# Patient Record
Sex: FEMALE | Race: WHITE | NOT HISPANIC OR LATINO | Employment: UNEMPLOYED | URBAN - METROPOLITAN AREA
[De-identification: names, ages, dates, MRNs, and addresses within clinical notes are randomized per-mention and may not be internally consistent; named-entity substitution may affect disease eponyms.]

---

## 2018-01-31 ENCOUNTER — HOSPITAL ENCOUNTER (EMERGENCY)
Facility: HOSPITAL | Age: 48
Discharge: HOME | End: 2018-01-31
Payer: COMMERCIAL

## 2018-01-31 VITALS
BODY MASS INDEX: 33.12 KG/M2 | OXYGEN SATURATION: 99 % | RESPIRATION RATE: 18 BRPM | WEIGHT: 194 LBS | DIASTOLIC BLOOD PRESSURE: 74 MMHG | HEART RATE: 103 BPM | TEMPERATURE: 97.8 F | HEIGHT: 64 IN | SYSTOLIC BLOOD PRESSURE: 125 MMHG
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
## 2018-01-31 PROCEDURE — 99283 EMERGENCY DEPT VISIT LOW MDM: CPT

## 2018-01-31 RX ORDER — LIDOCAINE HYDROCHLORIDE 10 MG/ML
10 INJECTION, SOLUTION EPIDURAL; INFILTRATION; INTRACAUDAL; PERINEURAL ONCE
Status: COMPLETED | OUTPATIENT
Start: 2018-01-31 | End: 2018-01-31

## 2018-01-31 RX ORDER — HYDROCODONE BITARTRATE AND ACETAMINOPHEN 5; 325 MG/1; MG/1
1 TABLET ORAL ONCE
Status: COMPLETED | OUTPATIENT
Start: 2018-01-31 | End: 2018-01-31

## 2018-01-31 RX ORDER — AMOXICILLIN AND CLAVULANATE POTASSIUM 875; 125 MG/1; MG/1
1 TABLET, FILM COATED ORAL ONCE
Status: COMPLETED | OUTPATIENT
Start: 2018-01-31 | End: 2018-01-31

## 2018-01-31 RX ORDER — AMOXICILLIN AND CLAVULANATE POTASSIUM 875; 125 MG/1; MG/1
1 TABLET, FILM COATED ORAL EVERY 12 HOURS SCHEDULED
Qty: 20 TABLET | Refills: 0 | Status: SHIPPED | OUTPATIENT
Start: 2018-01-31 | End: 2018-02-10

## 2018-01-31 RX ORDER — GINSENG 100 MG
1 CAPSULE ORAL ONCE
Status: COMPLETED | OUTPATIENT
Start: 2018-01-31 | End: 2018-01-31

## 2018-01-31 RX ORDER — FLUCONAZOLE 200 MG/1
200 TABLET ORAL DAILY
Qty: 1 TABLET | Refills: 0 | Status: SHIPPED | OUTPATIENT
Start: 2018-01-31 | End: 2018-02-01

## 2018-01-31 RX ADMIN — AMOXICILLIN AND CLAVULANATE POTASSIUM 1 TABLET: 875; 125 TABLET, FILM COATED ORAL at 18:44

## 2018-01-31 RX ADMIN — LIDOCAINE HYDROCHLORIDE 10 ML: 10 INJECTION, SOLUTION EPIDURAL; INFILTRATION; INTRACAUDAL; PERINEURAL at 18:56

## 2018-01-31 RX ADMIN — LIDOCAINE HYDROCHLORIDE 10 ML: 10 INJECTION, SOLUTION EPIDURAL; INFILTRATION; INTRACAUDAL; PERINEURAL at 18:43

## 2018-01-31 RX ADMIN — HYDROCODONE BITARTRATE AND ACETAMINOPHEN 1 TABLET: 5; 325 TABLET ORAL at 19:35

## 2018-01-31 RX ADMIN — BACITRACIN ZINC 1 SMALL APPLICATION: 500 OINTMENT TOPICAL at 18:44

## 2018-01-31 NOTE — ED PROVIDER NOTES
History  Chief Complaint   Patient presents with    Dog Bite     Has laceration on left forearm     60-year-old female presenting today with a dog bite to the left forearm that occurred about an hour ago  States that she was breaking up a dog fight when a Mark Kedar/morfin retriever bit her left arm  She owns these dogs and are up to date on vaccinations  Currently 6/10 pain nonradiating  Last tetanus vaccination was a year ago  States that the wound is gaping  None       Past Medical History:   Diagnosis Date    Disease of thyroid gland     Iron deficiency anemia        Past Surgical History:   Procedure Laterality Date    BACK SURGERY         History reviewed  No pertinent family history  I have reviewed and agree with the history as documented  Social History   Substance Use Topics    Smoking status: Never Smoker    Smokeless tobacco: Never Used    Alcohol use No        Review of Systems   Constitutional: Negative  Negative for activity change and appetite change  HENT: Negative  Eyes: Negative  Respiratory: Negative  Cardiovascular: Negative  Gastrointestinal: Negative  Genitourinary: Negative  Musculoskeletal: Negative  Skin: Positive for wound  Negative for color change, pallor and rash  Neurological: Negative  All other systems reviewed and are negative  Physical Exam  ED Triage Vitals [01/31/18 1824]   Temperature Pulse Respirations Blood Pressure SpO2   97 8 °F (36 6 °C) 103 18 125/74 99 %      Temp Source Heart Rate Source Patient Position - Orthostatic VS BP Location FiO2 (%)   Tympanic Monitor Sitting Right arm --      Pain Score       6           Orthostatic Vital Signs  Vitals:    01/31/18 1824   BP: 125/74   Pulse: 103   Patient Position - Orthostatic VS: Sitting       Physical Exam   Constitutional: She is oriented to person, place, and time  She appears well-developed and well-nourished     Cardiovascular: Normal rate, regular rhythm, normal heart sounds and intact distal pulses  Pulmonary/Chest: Effort normal and breath sounds normal    S PO2 is 99% indicating adequate oxygenation  Musculoskeletal: Normal range of motion  Arms:  Neurological: She is alert and oriented to person, place, and time  Skin: Skin is warm and dry  Capillary refill takes less than 2 seconds  Nursing note and vitals reviewed  ED Medications  Medications   HYDROcodone-acetaminophen (NORCO) 5-325 mg per tablet 1 tablet (not administered)   amoxicillin-clavulanate (AUGMENTIN) 875-125 mg per tablet 1 tablet (1 tablet Oral Given 1/31/18 1844)   lidocaine (PF) (XYLOCAINE-MPF) 1 % injection 10 mL (10 mL Infiltration Given 1/31/18 1843)   bacitracin topical ointment 1 small application (1 small application Topical Given 1/31/18 1844)   lidocaine (PF) (XYLOCAINE-MPF) 1 % injection 10 mL (10 mL Infiltration Given 1/31/18 1856)       Diagnostic Studies  Results Reviewed     None                 No orders to display              Procedures  Lac Repair  Date/Time: 1/31/2018 7:26 PM  Performed by: Shira Sen by: Salbador Raza   Consent: Verbal consent obtained    Risks and benefits: risks, benefits and alternatives were discussed  Consent given by: patient  Patient understanding: patient states understanding of the procedure being performed  Patient consent: the patient's understanding of the procedure matches consent given  Procedure consent: procedure consent matches procedure scheduled  Relevant documents: relevant documents present and verified  Test results: test results available and properly labeled  Site marked: the operative site was marked  Imaging studies: imaging studies available  Required items: required blood products, implants, devices, and special equipment available  Patient identity confirmed: verbally with patient  Time out: Immediately prior to procedure a "time out" was called to verify the correct patient, procedure, equipment, support staff and site/side marked as required  Body area: upper extremity  Location details: left lower arm  Laceration length: 9 cm  Contamination: The wound is contaminated  Tendon involvement: none  Nerve involvement: none  Anesthesia: local infiltration    Anesthesia:  Local Anesthetic: lidocaine 1% without epinephrine  Anesthetic total: 20 mL    Sedation:  Patient sedated: no    Wound Dehiscence:  Superficial Wound Dehiscence: simple closure      Procedure Details:  Preparation: Patient was prepped and draped in the usual sterile fashion  Irrigation solution: saline  Irrigation method: syringe  Amount of cleaning: extensive  Debridement: none  Degree of undermining: none  Skin closure: 5-0 nylon  Subcutaneous closure: 5-0 Vicryl  Number of sutures: 8  Technique: simple  Approximation: loose  Approximation difficulty: simple  Dressing: antibiotic ointment  Patient tolerance: Patient tolerated the procedure well with no immediate complications  Comments: 8 deep Vicryl sutures were placed as well as 8 skin closure sutures were loosely approximated  Phone Contacts  ED Phone Contact    ED Course  ED Course                                MDM  Number of Diagnoses or Management Options  Diagnosis management comments: I discussed with patient that due to the depth and gaping of the wound that it does need to be close however loosely approximated  In doing so I warned her that it will increase her risk of infection  Wound was extensively cleansed with saline and Betadine  Patient verbalizes understanding and agrees to closure at this time  She was started on Augmentin  She is up-to-date on all vaccinations  She is the owner of the PET that bit her  Informed return in 10 days for suture removal or sooner for any signs of infection such as opening of wound, spreading redness or drainage or fevers or significant pain   Patient verbalizes understanding and agrees with the above assessment and plan        Amount and/or Complexity of Data Reviewed  Review and summarize past medical records: yes  Discuss the patient with other providers: yes (Dr Marie Lorenzana)  Independent visualization of images, tracings, or specimens: yes      CritCare Time    Disposition  Final diagnoses:   Dog bite, initial encounter     Time reflects when diagnosis was documented in both MDM as applicable and the Disposition within this note     Time User Action Codes Description Comment    1/31/2018  7:27 PM Demetrius Altamirano, [address] [address] [place]  0XXA] Dog bite, initial encounter       ED Disposition     ED Disposition Condition Comment    Discharge  Denisealexander Abhilash discharge to home/self care  Condition at discharge: Good        Follow-up Information     Follow up With Specialties Details Why Contact Info Additional [phone] Emergency Department Emergency Medicine Go in 10 days For suture removal or sooner for any signs of infection such as spreading redness, fevers, drainage, tracking redness up arm, severe pain  [address] [facility] ED, [facility], [facility]        Patient's Medications   Discharge Prescriptions    AMOXICILLIN-CLAVULANATE (AUGMENTIN) 875-125 MG PER TABLET    Take 1 tablet by mouth every 12 (twelve) hours for 10 days       Start Date: 1/31/2018 End Date: 2/10/2018       Order Dose: 1 tablet       Quantity: 20 tablet    Refills: 0     No discharge procedures on file      ED Provider  Electronically Signed by           Cory Enriquez PA-C  01/31/18 2669

## 2018-02-01 NOTE — DISCHARGE INSTRUCTIONS
Animal Bite   WHAT YOU NEED TO KNOW:   Animal bite injuries range from shallow cuts to deep, life-threatening wounds  An animal can cut or puncture the skin when it bites  Your skin may be torn from your body  Your skin may swell or bruise even if the bite does not break the skin  Animal bites occur more often on the hands, arms, legs, and face  Bites from dogs and cats are the most common injuries  DISCHARGE INSTRUCTIONS:   Return to the emergency department if:   · You have a fever  · Your wound is red, swollen, and draining pus  · You see red streaks on the skin around the wound  · You can no longer move the bitten area  · Your heartbeat and breathing are much faster than usual     · You feel dizzy and confused  Contact your healthcare provider if:   · Your pain does not get better, even after you take pain medicine  · You have nightmares or flashbacks about the animal bite  · You have questions or concerns about your condition or care  Medicines: You may need any of the following:  · Antibiotics  prevent or treat a bacterial infection  · Prescription pain medicine  may be given  Ask how to take this medicine safely  · A tetanus vaccine  may be needed to prevent tetanus  Tetanus is a life-threatening bacterial infection that affects the nerves and muscles  The bacteria can be spread through animal bites  · A rabies vaccine  may be needed to prevent rabies  Rabies is a life-threatening viral infection  The virus can be spread through animal bites  · Take your medicine as directed  Contact your healthcare provider if you think your medicine is not helping or if you have side effects  Tell him of her if you are allergic to any medicine  Keep a list of the medicines, vitamins, and herbs you take  Include the amounts, and when and why you take them  Bring the list or the pill bottles to follow-up visits  Carry your medicine list with you in case of an emergency    Follow up with your healthcare provider in 1 to 2 days: You may need to return to have your stitches removed  Write down your questions so you remember to ask them during your visits  Self-care:   · Apply antibiotic ointment as directed  This helps prevent infection in minor skin wounds  It is available without a doctor's order  · Keep the wound clean and covered  Wash the wound every day with soap and water or germ-killing cleanser  Ask your healthcare provider about the kinds of bandages to use  · Apply ice on your wound  Ice helps decrease swelling and pain  Ice may also help prevent tissue damage  Use an ice pack, or put crushed ice in a plastic bag  Cover it with a towel and place it on your wound for 15 to 20 minutes every hour or as directed  · Elevate the wound area  Raise your wound above the level of your heart as often as you can  This will help decrease swelling and pain  Prop your wound on pillows or blankets to keep it elevated comfortably  Prevent another animal bite:   · Learn to recognize the signs of a scared or angry pet  Avoid quick, sudden movements  · Do not step between animals that are fighting  · Do not leave a pet alone with a young child  · Do not disturb an animal while it eats, sleeps, or cares for its young  · Do not approach an animal you do not know, especially one that is tied up or caged  · Stay away from animals that seem sick or act strangely  · Do not feed or capture wild animals  © 2017 [address] Information is for End User's use only and may not be sold, redistributed or otherwise used for commercial purposes  All illustrations and images included in CareNotes® are the copyrighted property of [employer]  or [address]  The above information is an  only  It is not intended as medical advice for individual conditions or treatments   Talk to your doctor, nurse or pharmacist before following any medical regimen to see if it is safe and effective for you

## 2019-01-23 ENCOUNTER — APPOINTMENT (EMERGENCY)
Dept: RADIOLOGY | Facility: HOSPITAL | Age: 49
End: 2019-01-23
Payer: COMMERCIAL

## 2019-01-23 ENCOUNTER — HOSPITAL ENCOUNTER (EMERGENCY)
Facility: HOSPITAL | Age: 49
Discharge: HOME | End: 2019-01-23
Attending: EMERGENCY MEDICINE | Admitting: EMERGENCY MEDICINE
Payer: COMMERCIAL

## 2019-01-23 VITALS
HEART RATE: 100 BPM | OXYGEN SATURATION: 96 % | DIASTOLIC BLOOD PRESSURE: 62 MMHG | WEIGHT: 195.99 LBS | SYSTOLIC BLOOD PRESSURE: 125 MMHG | TEMPERATURE: 98.8 F | RESPIRATION RATE: 18 BRPM | BODY MASS INDEX: 33.64 KG/M2

## 2019-01-23 DIAGNOSIS — J11.1 INFLUENZA IN ADULT: ICD-10-CM

## 2019-01-23 DIAGNOSIS — J20.9 ACUTE WHEEZY BRONCHITIS: Primary | ICD-10-CM

## 2019-01-23 LAB
ALBUMIN SERPL BCP-MCNC: 3.2 G/DL (ref 3.5–5)
ALP SERPL-CCNC: 62 U/L (ref 46–116)
ALT SERPL W P-5'-P-CCNC: 40 U/L (ref 12–78)
ANION GAP SERPL CALCULATED.3IONS-SCNC: 6 MMOL/L (ref 4–13)
AST SERPL W P-5'-P-CCNC: 25 U/L (ref 5–45)
BASOPHILS # BLD AUTO: 0.03 THOUSANDS/ΜL (ref 0–0.1)
BASOPHILS NFR BLD AUTO: 1 % (ref 0–1)
BILIRUB SERPL-MCNC: 0.2 MG/DL (ref 0.2–1)
BUN SERPL-MCNC: 7 MG/DL (ref 5–25)
CALCIUM SERPL-MCNC: 8.7 MG/DL (ref 8.3–10.1)
CHLORIDE SERPL-SCNC: 103 MMOL/L (ref 100–108)
CO2 SERPL-SCNC: 29 MMOL/L (ref 21–32)
CREAT SERPL-MCNC: 0.64 MG/DL (ref 0.6–1.3)
EOSINOPHIL # BLD AUTO: 0.02 THOUSAND/ΜL (ref 0–0.61)
EOSINOPHIL NFR BLD AUTO: 0 % (ref 0–6)
ERYTHROCYTE [DISTWIDTH] IN BLOOD BY AUTOMATED COUNT: 12.3 % (ref 11.6–15.1)
GFR SERPL CREATININE-BSD FRML MDRD: 107 ML/MIN/1.73SQ M
GLUCOSE SERPL-MCNC: 111 MG/DL (ref 65–140)
HCT VFR BLD AUTO: 38.9 % (ref 34.8–46.1)
HGB BLD-MCNC: 12.8 G/DL (ref 11.5–15.4)
IMM GRANULOCYTES # BLD AUTO: 0.04 THOUSAND/UL (ref 0–0.2)
IMM GRANULOCYTES NFR BLD AUTO: 1 % (ref 0–2)
LACTATE SERPL-SCNC: 1 MMOL/L (ref 0.5–2)
LYMPHOCYTES # BLD AUTO: 1.17 THOUSANDS/ΜL (ref 0.6–4.47)
LYMPHOCYTES NFR BLD AUTO: 19 % (ref 14–44)
MCH RBC QN AUTO: 30.4 PG (ref 26.8–34.3)
MCHC RBC AUTO-ENTMCNC: 32.9 G/DL (ref 31.4–37.4)
MCV RBC AUTO: 92 FL (ref 82–98)
MONOCYTES # BLD AUTO: 0.67 THOUSAND/ΜL (ref 0.17–1.22)
MONOCYTES NFR BLD AUTO: 11 % (ref 4–12)
NEUTROPHILS # BLD AUTO: 4.18 THOUSANDS/ΜL (ref 1.85–7.62)
NEUTS SEG NFR BLD AUTO: 68 % (ref 43–75)
NRBC BLD AUTO-RTO: 0 /100 WBCS
PLATELET # BLD AUTO: 178 THOUSANDS/UL (ref 149–390)
PMV BLD AUTO: 10.7 FL (ref 8.9–12.7)
POTASSIUM SERPL-SCNC: 3.6 MMOL/L (ref 3.5–5.3)
PROT SERPL-MCNC: 6.3 G/DL (ref 6.4–8.2)
RBC # BLD AUTO: 4.21 MILLION/UL (ref 3.81–5.12)
SODIUM SERPL-SCNC: 138 MMOL/L (ref 136–145)
WBC # BLD AUTO: 6.11 THOUSAND/UL (ref 4.31–10.16)

## 2019-01-23 PROCEDURE — 80053 COMPREHEN METABOLIC PANEL: CPT | Performed by: EMERGENCY MEDICINE

## 2019-01-23 PROCEDURE — 96374 THER/PROPH/DIAG INJ IV PUSH: CPT

## 2019-01-23 PROCEDURE — 83605 ASSAY OF LACTIC ACID: CPT | Performed by: EMERGENCY MEDICINE

## 2019-01-23 PROCEDURE — 85025 COMPLETE CBC W/AUTO DIFF WBC: CPT | Performed by: EMERGENCY MEDICINE

## 2019-01-23 PROCEDURE — 96361 HYDRATE IV INFUSION ADD-ON: CPT

## 2019-01-23 PROCEDURE — 71046 X-RAY EXAM CHEST 2 VIEWS: CPT

## 2019-01-23 PROCEDURE — 87040 BLOOD CULTURE FOR BACTERIA: CPT | Performed by: EMERGENCY MEDICINE

## 2019-01-23 PROCEDURE — 36415 COLL VENOUS BLD VENIPUNCTURE: CPT | Performed by: EMERGENCY MEDICINE

## 2019-01-23 PROCEDURE — 99285 EMERGENCY DEPT VISIT HI MDM: CPT

## 2019-01-23 RX ORDER — ALBUTEROL SULFATE 2.5 MG/3ML
5 SOLUTION RESPIRATORY (INHALATION) ONCE
Status: COMPLETED | OUTPATIENT
Start: 2019-01-23 | End: 2019-01-23

## 2019-01-23 RX ORDER — BENZONATATE 100 MG/1
100 CAPSULE ORAL EVERY 8 HOURS
Qty: 21 CAPSULE | Refills: 0 | Status: SHIPPED | OUTPATIENT
Start: 2019-01-23 | End: 2019-02-18 | Stop reason: SDUPTHER

## 2019-01-23 RX ORDER — BENZONATATE 100 MG/1
100 CAPSULE ORAL ONCE
Status: COMPLETED | OUTPATIENT
Start: 2019-01-23 | End: 2019-01-23

## 2019-01-23 RX ORDER — PREDNISONE 10 MG/1
TABLET ORAL
Qty: 30 TABLET | Refills: 0 | Status: SHIPPED | OUTPATIENT
Start: 2019-01-23 | End: 2019-02-18 | Stop reason: ALTCHOICE

## 2019-01-23 RX ORDER — ALBUTEROL SULFATE 90 UG/1
1 AEROSOL, METERED RESPIRATORY (INHALATION) ONCE
Status: COMPLETED | OUTPATIENT
Start: 2019-01-23 | End: 2019-01-23

## 2019-01-23 RX ORDER — GUAIFENESIN 600 MG
600 TABLET, EXTENDED RELEASE 12 HR ORAL ONCE
Status: COMPLETED | OUTPATIENT
Start: 2019-01-23 | End: 2019-01-23

## 2019-01-23 RX ORDER — METHYLPREDNISOLONE SODIUM SUCCINATE 125 MG/2ML
125 INJECTION, POWDER, LYOPHILIZED, FOR SOLUTION INTRAMUSCULAR; INTRAVENOUS ONCE
Status: COMPLETED | OUTPATIENT
Start: 2019-01-23 | End: 2019-01-23

## 2019-01-23 RX ADMIN — IPRATROPIUM BROMIDE 0.5 MG: 0.5 SOLUTION RESPIRATORY (INHALATION) at 10:14

## 2019-01-23 RX ADMIN — ALBUTEROL SULFATE 5 MG: 2.5 SOLUTION RESPIRATORY (INHALATION) at 10:14

## 2019-01-23 RX ADMIN — BENZONATATE 100 MG: 100 CAPSULE ORAL at 10:39

## 2019-01-23 RX ADMIN — SODIUM CHLORIDE 1000 ML: 0.9 INJECTION, SOLUTION INTRAVENOUS at 10:38

## 2019-01-23 RX ADMIN — ALBUTEROL SULFATE 5 MG: 2.5 SOLUTION RESPIRATORY (INHALATION) at 12:22

## 2019-01-23 RX ADMIN — METHYLPREDNISOLONE SODIUM SUCCINATE 125 MG: 125 INJECTION, POWDER, FOR SOLUTION INTRAMUSCULAR; INTRAVENOUS at 10:39

## 2019-01-23 RX ADMIN — GUAIFENESIN 600 MG: 600 TABLET, EXTENDED RELEASE ORAL at 12:22

## 2019-01-23 RX ADMIN — ALBUTEROL SULFATE 1 PUFF: 90 AEROSOL, METERED RESPIRATORY (INHALATION) at 13:51

## 2019-01-23 RX ADMIN — IPRATROPIUM BROMIDE 0.5 MG: 0.5 SOLUTION RESPIRATORY (INHALATION) at 12:22

## 2019-01-23 NOTE — DISCHARGE INSTRUCTIONS
Acute Bronchitis   WHAT YOU NEED TO KNOW:   What is acute bronchitis? Acute bronchitis is swelling and irritation in the air passages of your lungs  This irritation may cause you to cough or have other breathing problems  Acute bronchitis often starts because of another illness, such as a cold or the flu  The illness spreads from your nose and throat to your windpipe and airways  Bronchitis is often called a chest cold  Acute bronchitis lasts about 3 to 6 weeks and is usually not a serious illness  What causes acute bronchitis? · Infection  caused by a virus, bacteria, or a fungus    · Polluted air  caused by chemical fumes, dust, smoke, allergens, or pollution  What increases my risk for acute bronchitis? · Age, usually older adults    · Smoking cigarettes or being around cigarette smoke    · Chronic lung diseases or chronic sinus infections    · Weakened immune system    · Gastroesophageal reflux disease    · Allergies and environmental changes  What are the signs and symptoms of acute bronchitis? · A cough with sputum that may be clear, yellow, or green    · Feeling more tired than usual, and body aches    · A fever and chills    · Wheezing when you breathe    · A tight chest or pain when you breathe or cough  How is acute bronchitis diagnosed? Your healthcare provider may diagnose bronchitis by your symptoms  If he is not sure, you may need the following:  · Blood tests  will be done to see if your symptoms are caused by an infection  · X-ray  pictures of your lungs and heart may show signs of infection, such as pneumonia  Chest x-rays may also show fluid around your heart and lungs  How is acute bronchitis treated? Your healthcare provider will treat any condition that has caused your acute bronchitis  He may also give you any of the following:  · Ibuprofen or acetaminophen  are medicines that help lower your fever  They are available without a doctor's order   Ask your healthcare provider which medicine is right for you  Ask how much to take and how often to take it  Follow directions  These medicines can cause stomach bleeding if not taken correctly  Ibuprofen can cause kidney damage  Do not take ibuprofen if you have kidney disease, an ulcer, or allergies to aspirin  Acetaminophen can cause liver damage  Do not take more than 4,000 milligrams in 24 hours  · Decongestants  help loosen mucus in your lungs and make it easier to cough up  This can help you breathe easier  · Cough suppressants  decrease your urge to cough  If your cough produces mucus, do not take a cough suppressant unless your healthcare provider tells you to  Your healthcare provider may suggest that you take a cough suppressant at night so you can rest     · Inhalers  may be given  Your healthcare provider may give you one or more inhalers to help you breathe easier and cough less  An inhaler gives your medicine to open your airways  Ask your healthcare provider to show you how to use your inhaler correctly  How can I care for myself when I have acute bronchitis? · Get more rest   Rest helps your body to heal  Slowly start to do more each day  Rest when you feel it is needed  · Avoid irritants in the air  Avoid chemicals, fumes, and dust  Wear a face mask if you must work around dust or fumes  Stay inside on days when air pollution levels are high  If you have allergies, stay inside when pollen counts are high  Do not use aerosol products, such as spray-on deodorant, bug spray, and hair spray  · Do not smoke or be around others who smoke  Nicotine and other chemicals in cigarettes and cigars damages the cilia that move mucus out of your lungs  Ask your healthcare provider for information if you currently smoke and need help to quit  E-cigarettes or smokeless tobacco still contain nicotine  Talk to your healthcare provider before you use these products  · Drink liquids as directed    Liquids help keep your air passages moist and help you cough up mucus  You may need to drink more liquids when you have acute bronchitis  Ask how much liquid to drink each day and which liquids are best for you  · Use a humidifier or vaporizer  Use a cool mist humidifier or a vaporizer to increase air moisture in your home  This may make it easier for you to breathe and help decrease your cough  How can I decrease my risk for acute bronchitis? · Get the vaccinations you need  Ask your healthcare provider if you should get vaccinated against the flu or pneumonia  · Prevent the spread of germs  You can decrease your risk of acute bronchitis and other illnesses by doing the following:     [facility] your hands often with soap and water  Carry germ-killing hand lotion or gel with you  You can use the lotion or gel to clean your hands when soap and water are not available  ¨ Do not touch your eyes, nose, or mouth unless you have washed your hands first     ¨ Always cover your mouth when you cough to prevent the spread of germs  It is best to cough into a tissue or your shirt sleeve instead of into your hand  Ask those around you cover their mouths when they cough  ¨ Try to avoid people who have a cold or the flu  If you are sick, stay away from others as much as possible  When should I seek immediate care? · You cough up blood  · Your lips or fingernails turn blue  · You feel like you are not getting enough air when you breathe  When should I contact my healthcare provider? · You have a fever  · Your breathing problems do not go away or get worse  · Your cough does not get better within 4 weeks  · You have questions or concerns about your condition or care  CARE AGREEMENT:   You have the right to help plan your care  Learn about your health condition and how it may be treated  Discuss treatment options with your caregivers to decide what care you want to receive  You always have the right to refuse treatment  The above information is an  only  It is not intended as medical advice for individual conditions or treatments  Talk to your doctor, nurse or pharmacist before following any medical regimen to see if it is safe and effective for you  © 2017 [address] Information is for End User's use only and may not be sold, redistributed or otherwise used for commercial purposes  All illustrations and images included in CareNotes® are the copyrighted property of [employer]  or [employer]

## 2019-01-23 NOTE — ED PROVIDER NOTES
History  Chief Complaint   Patient presents with    Shortness of Breath     pt c/o of increased sob  Pt dx with the flu on Monday from PCP  pt states she thinks she now has pna       52-year-old female presents to the emergency department for evaluation of cough, wheezing, and shortness of breath  Patient states that she has a history of recurrent bronchitis  On the Monday of this week she was diagnosed with influenza  She has been taking Tamiflu  She has not had a fever for the past 2 days but she has continued to cough  She has a mostly dry hacking cough and wheezing  She coughs to the point where she nearly vomits  She has a headache secondary to severe coughing  She denies hemoptysis  Patient does not feel that she is improving  She is now having increased pain in her chest from coughing  She was concerned for possible pneumonia  History provided by:  Patient and medical records   used: No    Cough   Cough characteristics:  Hacking, vomit-inducing and harsh  Sputum characteristics:  Nondescript  Severity:  Severe  Onset quality:  Gradual  Duration:  1 week  Timing:  Constant  Progression:  Worsening  Chronicity:  New  Smoker: no    Context: sick contacts and upper respiratory infection    Relieved by:  Nothing  Worsened by:  Deep breathing and exposure to cold air  Ineffective treatments:  Beta-agonist inhaler  Associated symptoms: headaches, shortness of breath and wheezing    Associated symptoms: no chills, no diaphoresis, no ear pain, no fever and no sore throat    Risk factors: recent infection        None       Past Medical History:   Diagnosis Date    Chronic bronchitis (HCC)     Disease of thyroid gland     Iron deficiency anemia        Past Surgical History:   Procedure Laterality Date    BACK SURGERY         History reviewed  No pertinent family history  I have reviewed and agree with the history as documented      Social History   Substance Use Topics    Smoking status: Never Smoker    Smokeless tobacco: Never Used    Alcohol use No        Review of Systems   Constitutional: Negative for chills, diaphoresis and fever  HENT: Negative for ear pain and sore throat  Respiratory: Positive for cough, shortness of breath and wheezing  Gastrointestinal: Negative for abdominal pain  Genitourinary: Negative for dysuria  Musculoskeletal: Negative for back pain  Neurological: Positive for headaches  Negative for weakness  All other systems reviewed and are negative  Physical Exam  Physical Exam   Constitutional: She is oriented to person, place, and time  She appears well-developed and well-nourished  HENT:   Head: Normocephalic  Nose: Nose normal    Mouth/Throat: Oropharynx is clear and moist  No oropharyngeal exudate  Eyes: Pupils are equal, round, and reactive to light  Conjunctivae and EOM are normal    Neck: Normal range of motion  Neck supple  Cardiovascular: Regular rhythm, normal heart sounds and intact distal pulses  Tachycardia present  Pulmonary/Chest: Effort normal  No accessory muscle usage  No respiratory distress  She has wheezes  She has rhonchi  She has no rales  Frequent Hacking cough, nonproductive  Abdominal: Soft  Bowel sounds are normal  She exhibits no distension  There is no tenderness  There is no rebound and no guarding  Musculoskeletal: Normal range of motion  She exhibits no edema, tenderness or deformity  Lymphadenopathy:     She has no cervical adenopathy  Neurological: She is alert and oriented to person, place, and time  She has normal strength and normal reflexes  No cranial nerve deficit or sensory deficit  She exhibits normal muscle tone  Coordination and gait normal    Skin: Skin is warm, dry and intact  No rash noted  Psychiatric: She has a normal mood and affect  Her behavior is normal  Judgment and thought content normal    Nursing note and vitals reviewed        Vital Signs  ED Triage Vitals Temperature Pulse Respirations Blood Pressure SpO2   01/23/19 0920 01/23/19 0920 01/23/19 0920 01/23/19 0920 01/23/19 0920   98 8 °F (37 1 °C) 91 18 134/66 98 %      Temp Source Heart Rate Source Patient Position - Orthostatic VS BP Location FiO2 (%)   01/23/19 0920 01/23/19 0920 01/23/19 0920 01/23/19 0920 --   Oral Monitor Lying Right arm       Pain Score       01/23/19 0918       8           Vitals:    01/23/19 0920 01/23/19 0930 01/23/19 1220   BP: 134/66 134/66 125/62   Pulse: 91  100   Patient Position - Orthostatic VS: Lying  Lying       Visual Acuity      ED Medications  Medications   sodium chloride 0 9 % bolus 1,000 mL (0 mL Intravenous Stopped 1/23/19 1140)   benzonatate (TESSALON PERLES) capsule 100 mg (100 mg Oral Given 1/23/19 1039)   albuterol inhalation solution 5 mg (5 mg Nebulization Given 1/23/19 1014)   ipratropium (ATROVENT) 0 02 % inhalation solution 0 5 mg (0 5 mg Nebulization Given 1/23/19 1014)   methylPREDNISolone sodium succinate (Solu-MEDROL) injection 125 mg (125 mg Intravenous Given 1/23/19 1039)   ipratropium (ATROVENT) 0 02 % inhalation solution 0 5 mg (0 5 mg Nebulization Given 1/23/19 1222)   albuterol inhalation solution 5 mg (5 mg Nebulization Given 1/23/19 1222)   guaiFENesin (MUCINEX) 12 hr tablet 600 mg (600 mg Oral Given 1/23/19 1222)   albuterol (PROVENTIL HFA,VENTOLIN HFA) inhaler 1 puff (1 puff Inhalation Given 1/23/19 1351)       Diagnostic Studies  Results Reviewed     Procedure Component Value Units Date/Time    Blood culture #1 [[id number]] Collected:  01/23/19 1027    Lab Status:  Preliminary result Specimen:  Blood from Arm, Right Updated:  01/25/19 1501     Blood Culture No Growth at 48 hrs  Blood culture #2 [[id number]] Collected:  01/23/19 1038    Lab Status:  Preliminary result Specimen:  Blood from Arm, Left Updated:  01/25/19 1501     Blood Culture No Growth at 48 hrs      Comprehensive metabolic panel [[id number]]  (Abnormal) Collected:  01/23/19 1027    Lab Status:  Final result Specimen:  Blood from Arm, Right Updated:  01/23/19 1106     Sodium 138 mmol/L      Potassium 3 6 mmol/L      Chloride 103 mmol/L      CO2 29 mmol/L      ANION GAP 6 mmol/L      BUN 7 mg/dL      Creatinine 0 64 mg/dL      Glucose 111 mg/dL      Calcium 8 7 mg/dL      AST 25 U/L      ALT 40 U/L      Alkaline Phosphatase 62 U/L      Total Protein 6 3 (L) g/dL      Albumin 3 2 (L) g/dL      Total Bilirubin 0 20 mg/dL      eGFR 107 ml/min/1 73sq m     Narrative:         National Kidney Disease Education Program recommendations are as follows:  GFR calculation is accurate only with a steady state creatinine  Chronic Kidney disease less than 60 ml/min/1 73 sq  meters  Kidney failure less than 15 ml/min/1 73 sq  meters  Lactic acid, plasma [[id number]]  (Normal) Collected:  01/23/19 1027    Lab Status:  Final result Specimen:  Blood from Arm, Right Updated:  01/23/19 1100     LACTIC ACID 1 0 mmol/L     Narrative:         Result may be elevated if tourniquet was used during collection      CBC and differential [[id number]] Collected:  01/23/19 1027    Lab Status:  Final result Specimen:  Blood from Arm, Right Updated:  01/23/19 1037     WBC 6 11 Thousand/uL      RBC 4 21 Million/uL      Hemoglobin 12 8 g/dL      Hematocrit 38 9 %      MCV 92 fL      MCH 30 4 pg      MCHC 32 9 g/dL      RDW 12 3 %      MPV 10 7 fL      Platelets 674 Thousands/uL      nRBC 0 /100 WBCs      Neutrophils Relative 68 %      Immat GRANS % 1 %      Lymphocytes Relative 19 %      Monocytes Relative 11 %      Eosinophils Relative 0 %      Basophils Relative 1 %      Neutrophils Absolute 4 18 Thousands/µL      Immature Grans Absolute 0 04 Thousand/uL      Lymphocytes Absolute 1 17 Thousands/µL      Monocytes Absolute 0 67 Thousand/µL      Eosinophils Absolute 0 02 Thousand/µL      Basophils Absolute 0 03 Thousands/µL                  XR chest 2 views   Final Result by Shauna Aguayo MD (01/23 1325)      No acute cardiopulmonary disease  Workstation performed: [id number]                    Procedures  Procedures       Phone Contacts  ED Phone Contact    ED Course  ED Course as of Jan 26 0714 Wed Jan 23, 2019   1215 Patient reexamined by me  She continues with hacking cough and wheezing  She does feel slight improvement overall  Will repeat albuterol treatment and reassess  MDM  Number of Diagnoses or Management Options  Acute wheezy bronchitis: new and requires workup  Influenza in adult: established and improving     Amount and/or Complexity of Data Reviewed  Clinical lab tests: ordered and reviewed  Tests in the radiology section of CPT®: ordered and reviewed  Decide to obtain previous medical records or to obtain history from someone other than the patient: yes  Independent visualization of images, tracings, or specimens: yes    Risk of Complications, Morbidity, and/or Mortality  General comments: 42-year-old female with recent influenza infection presents with worsening symptoms of cough and wheezing  Patient has a history of recurrent bronchitis  I do believe the influenza virus likely triggered her bronchitis  No sign of pneumonia on x-ray  Will prescribe Tessalon for painful cough as well as prednisone for wheezing  Patient to continue her Tamiflu  She does have a bronchodilator  to use    Discussed signs and symptoms to return to the emergency department    Patient Progress  Patient progress: improved    CritCare Time    Disposition  Final diagnoses:   Acute wheezy bronchitis   Influenza in adult     Time reflects when diagnosis was documented in both MDM as applicable and the Disposition within this note     Time User Action Codes Description Comment    1/23/2019  1:44 PM Loreto Camejo Add [J20 9] Acute wheezy bronchitis     1/23/2019  1:48 PM Loreto Camejo Add [J11 1] Influenza in adult       ED Disposition     ED Disposition Condition Comment    Discharge  Terie Helm discharge to home/self care  Condition at discharge: Stable        Follow-up Information     Follow up With Specialties Details Why Contact Info    Pamela Connor MD [facility] Medicine Schedule an appointment as soon as possible for a visit in 2 days For recheck of current symptoms [address]  [place] [address]  [phone]            Discharge Medication List as of 1/23/2019  1:48 PM      START taking these medications    Details   benzonatate (TESSALON PERLES) 100 mg capsule Take 1 capsule (100 mg total) by mouth every 8 (eight) hours, Starting Wed 1/23/2019, Print      predniSONE 10 mg tablet 50 mg PO x 2 days then decrease by 10 mg every 2 days until finished, Print           No discharge procedures on file      ED Provider  Electronically Signed by           Renata Irving DO  01/26/19 6357

## 2019-01-28 LAB
BACTERIA BLD CULT: NORMAL
BACTERIA BLD CULT: NORMAL

## 2019-02-18 ENCOUNTER — CONSULT (OUTPATIENT)
Dept: PULMONOLOGY | Facility: MEDICAL CENTER | Age: 49
End: 2019-02-18
Payer: COMMERCIAL

## 2019-02-18 VITALS
SYSTOLIC BLOOD PRESSURE: 112 MMHG | BODY MASS INDEX: 33.63 KG/M2 | HEART RATE: 100 BPM | OXYGEN SATURATION: 98 % | TEMPERATURE: 97.4 F | DIASTOLIC BLOOD PRESSURE: 82 MMHG | RESPIRATION RATE: 12 BRPM | WEIGHT: 197 LBS | HEIGHT: 64 IN

## 2019-02-18 DIAGNOSIS — J41.8 MIXED SIMPLE AND MUCOPURULENT CHRONIC BRONCHITIS (HCC): ICD-10-CM

## 2019-02-18 DIAGNOSIS — R05.9 COUGH: Primary | ICD-10-CM

## 2019-02-18 DIAGNOSIS — R05.3 COUGH, PERSISTENT: ICD-10-CM

## 2019-02-18 DIAGNOSIS — J20.9 ACUTE WHEEZY BRONCHITIS: ICD-10-CM

## 2019-02-18 DIAGNOSIS — J45.991 ASTHMA, COUGH VARIANT: ICD-10-CM

## 2019-02-18 PROCEDURE — 94640 AIRWAY INHALATION TREATMENT: CPT | Performed by: INTERNAL MEDICINE

## 2019-02-18 PROCEDURE — 99204 OFFICE O/P NEW MOD 45 MIN: CPT | Performed by: INTERNAL MEDICINE

## 2019-02-18 RX ORDER — RIZATRIPTAN BENZOATE 10 MG/1
TABLET ORAL
COMMUNITY
Start: 2017-10-03 | End: 2020-06-04 | Stop reason: SDUPTHER

## 2019-02-18 RX ORDER — ZOLPIDEM TARTRATE 10 MG/1
10 TABLET ORAL
COMMUNITY
Start: 2019-02-15

## 2019-02-18 RX ORDER — BENZONATATE 100 MG/1
100 CAPSULE ORAL 3 TIMES DAILY PRN
Qty: 21 CAPSULE | Refills: 0 | Status: SHIPPED | OUTPATIENT
Start: 2019-02-18

## 2019-02-18 RX ORDER — GABAPENTIN 100 MG/1
CAPSULE ORAL
COMMUNITY
Start: 2018-11-01 | End: 2020-06-04 | Stop reason: SDUPTHER

## 2019-02-18 RX ORDER — IPRATROPIUM BROMIDE AND ALBUTEROL SULFATE 2.5; .5 MG/3ML; MG/3ML
3 SOLUTION RESPIRATORY (INHALATION) ONCE
Status: COMPLETED | OUTPATIENT
Start: 2019-02-18 | End: 2019-02-18

## 2019-02-18 RX ORDER — HYDROCODONE POLISTIREX AND CHLORPHENIRAMINE POLISTIREX 10; 8 MG/5ML; MG/5ML
5 SUSPENSION, EXTENDED RELEASE ORAL
Qty: 120 ML | Refills: 0 | Status: SHIPPED | OUTPATIENT
Start: 2019-02-18

## 2019-02-18 RX ORDER — PREDNISONE 20 MG/1
TABLET ORAL
Qty: 20 TABLET | Refills: 0 | Status: SHIPPED | OUTPATIENT
Start: 2019-02-18 | End: 2020-06-04 | Stop reason: SDUPTHER

## 2019-02-18 RX ORDER — DIAZEPAM 5 MG/1
TABLET ORAL EVERY 12 HOURS
COMMUNITY
End: 2020-06-04 | Stop reason: SDUPTHER

## 2019-02-18 RX ADMIN — IPRATROPIUM BROMIDE AND ALBUTEROL SULFATE 3 ML: 2.5; .5 SOLUTION RESPIRATORY (INHALATION) at 15:53

## 2019-02-18 NOTE — ASSESSMENT & PLAN NOTE
Patient likely has chronic bronchitis  PFT was deferred today  She is currently coughing and is symptomatic  Plan includes PFT done at a later time  Consideration also for complete pulmonary function test would be appropriate  Currently she will have oral prednisone as well as cough suppressant  Plan is to follow-up if cough persist   She did have a recent chest x-ray done January 23, 2019 that showed no active disease

## 2019-02-18 NOTE — ASSESSMENT & PLAN NOTE
Lance Lopez likely has postinfectious cough  According to patient she has a history of chronic bronchitis and will often have cough that lasts up to 2 months post infection  She recently had influenza via nasal swab diagnosed January 21, 2019  She was given Tamiflu and then presented to [facility] days later  Chest x-ray was normal   She was given IV Solu-Medrol with tapering dose of prednisone  She is here today for persistent cough  I did not give pulmonary function test at this time as she is coughing and would likely have difficulty performing test   She was given a nebulizer treatment with DuoNeb  I am going to give her tapering dose of oral prednisone  He is responding the past to oral steroid  She did have a sample of Symbicort which is inhaled corticosteroids with long-acting beta agonists and reports this is not helping her

## 2019-02-18 NOTE — PATIENT INSTRUCTIONS
I have ordered for you a tapering dose of prednisone  20 mg tabs will be taken as follows  Two tablets daily with food for 5 days then 1 tablet daily with food  For 5 days  I will also ordered for you Tussionex suspension  Please use this sparingly in only at nighttime as needed for cough  Do not exceed  5 mL  Tessalon Perles are also ordered for you    When you feel better your invited to return for pulmonary function test

## 2019-02-18 NOTE — PROGRESS NOTES
Assessment/Plan:     Problem List Items Addressed This Visit        Respiratory    Mixed simple and mucopurulent chronic bronchitis ([address] )     Patient likely has chronic bronchitis  PFT was deferred today  She is currently coughing and is symptomatic  Plan includes PFT done at a later time  Consideration also for complete pulmonary function test would be appropriate  Currently she will have oral prednisone as well as cough suppressant  Plan is to follow-up if cough persist   She did have a recent chest x-ray done January 23, 2019 that showed no active disease  Relevant Medications    ipratropium-albuterol (DUO-NEB) 0 5-2 5 mg/3 mL inhalation solution 3 mL (Start on 2/18/2019  4:00 PM)    hydrocodone-chlorpheniramine polistirex (TUSSIONEX) 10-8 mg/5 mL ER suspension    benzonatate (TESSALON PERLES) 100 mg capsule       Other    Cough, persistent     Eric Zamora likely has postinfectious cough  According to patient she has a history of chronic bronchitis and will often have cough that lasts up to 2 months post infection  She recently had influenza via nasal swab diagnosed January 21, 2019  She was given Tamiflu and then presented to [facility] 2 days later  Chest x-ray was normal   She was given IV Solu-Medrol with tapering dose of prednisone  She is here today for persistent cough  I did not give pulmonary function test at this time as she is coughing and would likely have difficulty performing test   She was given a nebulizer treatment with DuoNeb  I am going to give her tapering dose of oral prednisone  He is responding the past to oral steroid  She did have a sample of Symbicort which is inhaled corticosteroids with long-acting beta agonists and reports this is not helping her               Other Visit Diagnoses     Cough    -  Primary    Relevant Medications    ipratropium-albuterol (DUO-NEB) 0 5-2 5 mg/3 mL inhalation solution 3 mL (Start on 2/18/2019  4:00 PM) hydrocodone-chlorpheniramine polistirex (TUSSIONEX) 10-8 mg/5 mL ER suspension    Asthma, cough variant        Relevant Medications    ipratropium-albuterol (DUO-NEB) 0 5-2 5 mg/3 mL inhalation solution 3 mL (Start on 2/18/2019  4:00 PM)    predniSONE 20 mg tablet    Acute wheezy bronchitis        Relevant Medications    ipratropium-albuterol (DUO-NEB) 0 5-2 5 mg/3 mL inhalation solution 3 mL (Start on 2/18/2019  4:00 PM)    hydrocodone-chlorpheniramine polistirex (TUSSIONEX) 10-8 mg/5 mL ER suspension    benzonatate (TESSALON PERLES) 100 mg capsule          HPI:     Antony Heart is here today for chronic bronchitis  She has never been a smoker  According to patient, she has had a history of bronchitis for 15 or 20 years  She has seen a pulmonologist in the past at [facility] for which pulmonary function tests were done  Antony Heart presented to the emergency room with [facility] on January 23, 2019  She was diagnosed with influenza on January 21st 2019  She completed Tamiflu  She was given 125 mg of Solu-Medrol in the emergency room and also albuterol inhalation  She was also given a script for [employer] and IV fluids  He had a chest x-ray that showed no acute cardiopulmonary disease  She was seen by her primary care physician on January 30, 2019  Dr Jacquelyn Saavedra   She was diagnosed with post viral cough syndrome  She was ordered Tussionex suspension and Symbicort      All q for evaluation of the uestions are answered to the patient's satisfaction and understanding  She verbalizes understanding  She is encouraged to call with any further questions or concerns  Portions of the record may have been created with voice recognition software  Occasional wrong word or "sound a like" substitutions may have occurred due to the inherent limitations of voice recognition software  Read the chart carefully and recognize, using context, where substitutions have occurred      Electronically Signed by JIM Altman    ______________________________________________________________________    Chief Complaint:   Chief Complaint   Patient presents with   [facility] cough flu     Shortness of Breath    Cough     productive     Bronchitis     Chronic       Patient ID: Barbra Barba is a 53 y o  y o  female has a past medical history of Chronic bronchitis ([facility] [address] ), COPD (chronic obstructive pulmonary disease) ([facility] [address] ), Disease of thyroid gland, Iron deficiency anemia, Pneumonia, and Sinusitis  2/18/2019  Patient presents today for follow-up visit  Shortness of Breath     Cough   This is a chronic problem  The current episode started more than 1 year ago  The problem occurs hourly  The cough is productive of sputum  Associated symptoms include postnasal drip and shortness of breath  The symptoms are aggravated by lying down  She has tried a beta-agonist inhaler, oral steroids and steroid inhaler for the symptoms  The treatment provided mild relief  Review of Systems   Constitutional: Negative  HENT: Positive for postnasal drip  Eyes: Negative  Respiratory: Positive for cough and shortness of breath  Cardiovascular: Negative  Gastrointestinal: Negative  Endocrine: Negative  Genitourinary: Negative  Musculoskeletal: Negative  Skin: Negative  Allergic/Immunologic: Negative  Neurological: Negative  Hematological: Negative  Psychiatric/Behavioral: Negative  Smoking history: She reports that she has never smoked  She has never used smokeless tobacco     The following portions of the patient's history were reviewed and updated as appropriate: allergies, current medications, past family history, past medical history, past social history, past surgical history and problem list       There is no immunization history on file for this patient    Current Outpatient Medications   Medication Sig Dispense Refill    gabapentin (NEURONTIN) 100 mg capsule TAKE 1 CAPSULE BY MOUTH THREE TIMES DAILY      Phentermine-Topiramate 15-92 MG CP24 Take 1 tablet by mouth daily      rizatriptan (MAXALT) 10 MG tablet rizatriptan 10 mg tablet      traMADol-acetaminophen (ULTRACET) 37 5-325 mg per tablet every 4 (four) hours      zolpidem (AMBIEN) 10 mg tablet Take 10 mg by mouth      benzonatate (TESSALON PERLES) 100 mg capsule Take 1 capsule (100 mg total) by mouth 3 (three) times a day as needed for cough 21 capsule 0    diazepam (VALIUM) 5 mg tablet Every 12 hours      hydrocodone-chlorpheniramine polistirex (TUSSIONEX) 10-8 mg/5 mL ER suspension Take 5 mL by mouth daily at bedtime as needed for coughMax Daily Amount: 5 mL 120 mL 0    Mirabegron ER (MYRBETRIQ) 50 MG TB24 Myrbetriq 50 mg tablet,extended release      predniSONE 20 mg tablet Take two tabs daily for five days and then one tablet daily for five days  Extra tabs 20 tablet 0     Current Facility-Administered Medications   Medication Dose Route Frequency Provider Last Rate Last Dose    ipratropium-albuterol (DUO-NEB) 0 5-2 5 mg/3 mL inhalation solution 3 mL  3 mL Nebulization Once JIM Boone         Allergies: Clindamycin; Deserpidine; Desyrel [trazodone]; Gemifloxacin; Levofloxacin; and Terbutaline    Objective:  Vitals:    02/18/19 1451   BP: 112/82   BP Location: Left arm   Patient Position: Sitting   Cuff Size: Standard   Pulse: 100   Resp: 12   Temp: (!) 97 4 °F (36 3 °C)   TempSrc: Tympanic   SpO2: 98%   Weight: 89 4 kg (197 lb)   Height: 5' 4" (1 626 m)   Oxygen Therapy  SpO2: 98 %    Wt Readings from Last 3 Encounters:   02/18/19 89 4 kg (197 lb)   01/23/19 88 9 kg (195 lb 15 8 oz)   01/31/18 88 kg (194 lb)     Body mass index is 33 81 kg/m²  Physical Exam   Constitutional: She appears well-developed and well-nourished  HENT:   Head: Normocephalic and atraumatic  Mouth/Throat: Oropharynx is clear and moist    Eyes: Pupils are equal, round, and reactive to light   EOM are normal    Neck: Normal range of motion  Neck supple  No tracheal deviation present  Cardiovascular: Normal rate and regular rhythm  Pulmonary/Chest: Effort normal and breath sounds normal    Abdominal: Soft  Musculoskeletal: Normal range of motion  Right lower leg: Normal         Left lower leg: Normal    Skin: Skin is dry     Mini neb  Performed by: JIM Robbins  Authorized by: JIM Robbins     Number of treatments:  1  Treatment 1:   Pre-Procedure     Symptoms:  Cough    Lung Sounds:  CTA    HR:  90    SP02:  98%    Medication Administered:  Duoneb - Albuterol 2 5 mg/Atrovent 0 5 mg        Lab Review:   Admission on 01/23/2019, Discharged on 01/23/2019   Component Date Value    WBC 01/23/2019 6 11     RBC 01/23/2019 4 21     Hemoglobin 01/23/2019 12 8     Hematocrit 01/23/2019 38 9     MCV 01/23/2019 92     MCH 01/23/2019 30 4     MCHC 01/23/2019 32 9     RDW 01/23/2019 12 3     MPV 01/23/2019 10 7     Platelets 54/84/6529 178     nRBC 01/23/2019 0     Neutrophils Relative 01/23/2019 68     Immat GRANS % 01/23/2019 1     Lymphocytes Relative 01/23/2019 19     Monocytes Relative 01/23/2019 11     Eosinophils Relative 01/23/2019 0     Basophils Relative 01/23/2019 1     Neutrophils Absolute 01/23/2019 4 18     Immature Grans Absolute 01/23/2019 0 04     Lymphocytes Absolute 01/23/2019 1 17     Monocytes Absolute 01/23/2019 0 67     Eosinophils Absolute 01/23/2019 0 02     Basophils Absolute 01/23/2019 0 03     Sodium 01/23/2019 138     Potassium 01/23/2019 3 6     Chloride 01/23/2019 103     CO2 01/23/2019 29     ANION GAP 01/23/2019 6     BUN 01/23/2019 7     Creatinine 01/23/2019 0 64     Glucose 01/23/2019 111     Calcium 01/23/2019 8 7     AST 01/23/2019 25     ALT 01/23/2019 40     Alkaline Phosphatase 01/23/2019 62     Total Protein 01/23/2019 6 3*    Albumin 01/23/2019 3 2*    Total Bilirubin 01/23/2019 0 20     eGFR 01/23/2019 107     LACTIC ACID 01/23/2019 1 0     Blood Culture 01/23/2019 No Growth After 5 Days   Blood Culture 01/23/2019 No Growth After 5 Days  Diagnostics:  I have personally reviewed pertinent films in PACS    Office Spirometry Results:     ESS:    Xr Chest 2 Views    Result Date: 1/23/2019  Narrative: CHEST INDICATION:   cough  COMPARISON:  None EXAM PERFORMED/VIEWS:  XR CHEST PA & LATERAL FINDINGS: Cardiomediastinal silhouette appears unremarkable  The lungs are clear  No pneumothorax or pleural effusion  Osseous structures appear within normal limits for patient age  Impression: No acute cardiopulmonary disease   Workstation performed: [id number]

## 2020-06-04 ENCOUNTER — OFFICE VISIT (OUTPATIENT)
Dept: URGENT CARE | Facility: CLINIC | Age: 50
End: 2020-06-04
Payer: COMMERCIAL

## 2020-06-04 VITALS
OXYGEN SATURATION: 100 % | WEIGHT: 219 LBS | BODY MASS INDEX: 37.59 KG/M2 | HEART RATE: 99 BPM | TEMPERATURE: 98.9 F | RESPIRATION RATE: 16 BRPM | SYSTOLIC BLOOD PRESSURE: 118 MMHG | DIASTOLIC BLOOD PRESSURE: 76 MMHG

## 2020-06-04 DIAGNOSIS — Z11.59 SPECIAL SCREENING EXAMINATION FOR UNSPECIFIED VIRAL DISEASE: Primary | ICD-10-CM

## 2020-06-04 DIAGNOSIS — Z01.818 PRE-OP EVALUATION: ICD-10-CM

## 2020-06-04 PROBLEM — N39.3 FEMALE GENUINE STRESS INCONTINENCE: Status: ACTIVE | Noted: 2018-07-16

## 2020-06-04 PROBLEM — R53.83 FATIGUE: Status: ACTIVE | Noted: 2019-10-02

## 2020-06-04 PROBLEM — Z51.89 ENCOUNTER FOR WOUND CARE: Status: ACTIVE | Noted: 2018-04-23

## 2020-06-04 PROBLEM — N81.2 UTEROVAGINAL PROLAPSE, INCOMPLETE: Status: ACTIVE | Noted: 2018-07-16

## 2020-06-04 PROBLEM — M25.462 EFFUSION OF LEFT KNEE JOINT: Status: ACTIVE | Noted: 2019-05-01

## 2020-06-04 PROBLEM — E66.09 CLASS 1 OBESITY DUE TO EXCESS CALORIES WITHOUT SERIOUS COMORBIDITY IN ADULT: Status: ACTIVE | Noted: 2018-06-11

## 2020-06-04 PROBLEM — E06.3 HASHIMOTO'S THYROIDITIS: Status: ACTIVE | Noted: 2017-06-23

## 2020-06-04 PROBLEM — R87.810 CERVICAL HIGH RISK HPV (HUMAN PAPILLOMAVIRUS) TEST POSITIVE: Status: ACTIVE | Noted: 2017-08-01
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
## 2020-06-04 PROCEDURE — 99203 OFFICE O/P NEW LOW 30 MIN: CPT | Performed by: FAMILY MEDICINE

## 2020-06-04 PROCEDURE — U0003 INFECTIOUS AGENT DETECTION BY NUCLEIC ACID (DNA OR RNA); SEVERE ACUTE RESPIRATORY SYNDROME CORONAVIRUS 2 (SARS-COV-2) (CORONAVIRUS DISEASE [COVID-19]), AMPLIFIED PROBE TECHNIQUE, MAKING USE OF HIGH THROUGHPUT TECHNOLOGIES AS DESCRIBED BY CMS-2020-01-R: HCPCS | Performed by: FAMILY MEDICINE

## 2020-06-04 RX ORDER — DIAZEPAM 5 MG/1
TABLET ORAL
COMMUNITY

## 2020-06-04 RX ORDER — DIPHENOXYLATE HYDROCHLORIDE AND ATROPINE SULFATE 2.5; .025 MG/1; MG/1
1 TABLET ORAL
COMMUNITY
Start: 2020-03-02 | End: 2021-03-02

## 2020-06-04 RX ORDER — ALBUTEROL SULFATE 90 UG/1
2 AEROSOL, METERED RESPIRATORY (INHALATION)
COMMUNITY
Start: 2020-04-03

## 2020-06-04 RX ORDER — BUDESONIDE AND FORMOTEROL FUMARATE DIHYDRATE 160; 4.5 UG/1; UG/1
AEROSOL RESPIRATORY (INHALATION)
COMMUNITY
Start: 2020-04-03

## 2020-06-04 RX ORDER — GABAPENTIN 100 MG/1
100 CAPSULE ORAL 3 TIMES DAILY
COMMUNITY
Start: 2020-04-22 | End: 2020-07-22

## 2020-06-04 RX ORDER — RIZATRIPTAN BENZOATE 10 MG/1
TABLET ORAL
COMMUNITY
Start: 2020-05-18

## 2020-06-09 LAB — SARS-COV-2 RNA SPEC QL NAA+PROBE: NOT DETECTED

## 2022-10-12 PROBLEM — Z11.59 SPECIAL SCREENING EXAMINATION FOR VIRAL DISEASE: Status: RESOLVED | Noted: 2020-06-04 | Resolved: 2022-10-12

## 2024-06-16 ENCOUNTER — APPOINTMENT (OUTPATIENT)
Dept: RADIOLOGY | Facility: CLINIC | Age: 54
End: 2024-06-16
Payer: COMMERCIAL

## 2024-06-16 ENCOUNTER — OFFICE VISIT (OUTPATIENT)
Dept: URGENT CARE | Facility: CLINIC | Age: 54
End: 2024-06-16
Payer: COMMERCIAL

## 2024-06-16 VITALS
BODY MASS INDEX: 23.14 KG/M2 | OXYGEN SATURATION: 100 % | DIASTOLIC BLOOD PRESSURE: 79 MMHG | HEART RATE: 80 BPM | TEMPERATURE: 97.2 F | WEIGHT: 134.8 LBS | RESPIRATION RATE: 16 BRPM | SYSTOLIC BLOOD PRESSURE: 130 MMHG

## 2024-06-16 DIAGNOSIS — T07.XXXA MULTIPLE PUNCTURE WOUNDS: ICD-10-CM
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
## 2024-06-16 DIAGNOSIS — S91.331A PUNCTURE WOUND OF RIGHT FOOT, INITIAL ENCOUNTER: ICD-10-CM

## 2024-06-16 DIAGNOSIS — R07.89 CHEST WALL PAIN: ICD-10-CM

## 2024-06-16 DIAGNOSIS — S61.431A PUNCTURE WOUND OF RIGHT HAND WITHOUT FOREIGN BODY, INITIAL ENCOUNTER: ICD-10-CM

## 2024-06-16 DIAGNOSIS — S61.431A PUNCTURE WOUND OF RIGHT HAND WITHOUT FOREIGN BODY, INITIAL ENCOUNTER: Primary | ICD-10-CM

## 2024-06-16 PROCEDURE — 99204 OFFICE O/P NEW MOD 45 MIN: CPT | Performed by: PHYSICIAN ASSISTANT

## 2024-06-16 PROCEDURE — 73130 X-RAY EXAM OF HAND: CPT

## 2024-06-16 PROCEDURE — 73630 X-RAY EXAM OF FOOT: CPT

## 2024-06-16 RX ORDER — FELODIPINE 2.5 MG/1
2.5 TABLET, EXTENDED RELEASE ORAL DAILY
COMMUNITY
Start: 2024-06-05 | End: 2025-06-05

## 2024-06-16 RX ORDER — DARIDOREXANT 25 MG/1
1 TABLET, FILM COATED ORAL EVERY EVENING
COMMUNITY

## 2024-06-16 RX ORDER — AMOXICILLIN AND CLAVULANATE POTASSIUM 875; 125 MG/1; MG/1
1 TABLET, FILM COATED ORAL 2 TIMES DAILY WITH MEALS
Qty: 6 TABLET | Refills: 0 | Status: SHIPPED | OUTPATIENT
Start: 2024-06-16 | End: 2024-06-19

## 2024-06-16 RX ORDER — TIRZEPATIDE 7.5 MG/.5ML
7.5 INJECTION, SOLUTION SUBCUTANEOUS
COMMUNITY
Start: 2024-06-05 | End: 2024-09-03

## 2024-06-16 RX ORDER — FLUOXETINE 10 MG/1
10 TABLET, FILM COATED ORAL DAILY
COMMUNITY
Start: 2024-06-05

## 2024-06-16 NOTE — PROGRESS NOTES
[facility]        NAME: Yaneth Ivan is a 53 y.o. female  : 1970    MRN: [id number]  DATE: 2024  TIME: 2:14 PM    Assessment and Plan   Puncture wound of right hand without foreign body, initial encounter [S61.431A]  1. Puncture wound of right hand without foreign body, initial encounter  XR hand 3+ vw right    amoxicillin-clavulanate (AUGMENTIN) 875-125 mg per tablet      2. Puncture wound of right foot, initial encounter  XR foot 3+ vw right    amoxicillin-clavulanate (AUGMENTIN) 875-125 mg per tablet      3. Multiple puncture wounds  amoxicillin-clavulanate (AUGMENTIN) 875-125 mg per tablet      4. Chest wall pain        5. Dog bite, initial encounter  amoxicillin-clavulanate (AUGMENTIN) 875-125 mg per tablet        XRs with no acute osseous abnormality per my preliminary read, pending official radiology report.    Pt confirmed she took amoxicillin, not Augmentin, so will send 3 days of Augmentin. Wound care precautions discussed. Pt declined rib xray and refused tetanus shot. Supportive care discussed. Discussed strict return to care precautions as well as red flag symptoms which should prompt immediate ED referral. Pt verbalized understanding and is in agreement with plan.  Please follow up with your primary care provider within the next week. Please remember that your visit today was with an urgent care provider and should not replace follow up with your primary care provider for chronic medical issues or annual physicals.       Patient Instructions       Follow up with PCP in 3-5 days.  Proceed to  ER if symptoms worsen.    If tests are performed, our office will contact you with results only if changes need to made to the care plan discussed with you at the visit. [facility]    Chief Complaint     Chief Complaint   Patient presents with    Dog Bite     Reports multiple dog bites on B/L arms, feet that occurred on Friday night. Also  reporting left rib pain and inability to breath in deeply due to pain. States she was attempting to break up a dog fight involving her animals. Reports animals are all up to date on Rabies vaccines. Reports she cleaned the wounds and received 3 days worth of Amoxicillin from a family friend. Last dose of Amoxicillin last night.          History of Present Illness       Patient is a 53-year-old female with PMH COPD, anemia, thyroid disease presenting with multiple puncture wounds after breaking up a dog fight 2 days ago.  States she has 10 dogs and several of them tried to attack one 2 days ago.  She attempted to break up the fight and had to throw herself on top of the dog being attacked.  Sustained multiple puncture wounds and bruises from trying to break it up.  Has been keeping wounds clean and dry.  Took 3 days of regular amoxicillin from a family friend.  Believes her last tetanus was 6 years ago.  States all dogs are up-to-date on vaccines.  Also reports that since breaking up the fight she has had left anterior chest soreness, hurts to touch and move.  Hurts to take a deep breath but is able to. Most pain located in 2nd and 3rd R digits of hand and dorsum of R foot.        Review of Systems   Review of Systems   Constitutional:  Negative for chills and diaphoresis.   Respiratory:  Negative for shortness of breath.    Cardiovascular:  Negative for leg swelling.   Gastrointestinal:  Negative for nausea and vomiting.   Musculoskeletal:  Positive for arthralgias, joint swelling and myalgias. Negative for back pain and neck pain.   Skin:  Positive for wound.   Neurological:  Negative for weakness and numbness.         Current Medications       Current Outpatient Medications:     albuterol (PROVENTIL HFA,VENTOLIN HFA) 90 mcg/act inhaler, Inhale 2 puffs, Disp: , Rfl:     amoxicillin-clavulanate (AUGMENTIN) 875-125 mg per tablet, Take 1 tablet by mouth 2 (two) times a day with meals for 3 days, Disp: 6 tablet, Rfl:  0    benzonatate (TESSALON PERLES) 100 mg capsule, Take 1 capsule (100 mg total) by mouth 3 (three) times a day as needed for cough, Disp: 21 capsule, Rfl: 0    diazepam (VALIUM) 5 mg tablet, Every 12 hours, Disp: , Rfl:     felodipine (PLENDIL) 2.5 mg 24 hr tablet, Take 2.5 mg by mouth daily, Disp: , Rfl:     FLUoxetine (PROzac) 10 MG tablet, Take 10 mg by mouth daily, Disp: , Rfl:     Mounjaro 7.5 MG/0.5ML, Inject 7.5 mg under the skin every 7 days, Disp: , Rfl:     Quviviq 25 MG TABS, Take 1 tablet by mouth every evening, Disp: , Rfl:     rizatriptan (MAXALT) 10 MG tablet, rizatriptan 10 mg tablet, Disp: , Rfl:     traMADol-acetaminophen (ULTRACET) 37.5-325 mg per tablet, Take 1 tablet by mouth, Disp: , Rfl:     gabapentin (NEURONTIN) 100 mg capsule, Take 100 mg by mouth Three times a day, Disp: , Rfl:     hydrocodone-chlorpheniramine polistirex (TUSSIONEX) 10-8 mg/5 mL ER suspension, Take 5 mL by mouth daily at bedtime as needed for coughMax Daily Amount: 5 mL (Patient not taking: Reported on 6/16/2024), Disp: 120 mL, Rfl: 0    Mirabegron ER (Myrbetriq) 50 MG TB24, Myrbetriq 50 mg tablet,extended release (Patient not taking: Reported on 6/16/2024), Disp: , Rfl:     Phentermine-Topiramate 15-92 MG CP24, Take 1 tablet by mouth daily (Patient not taking: Reported on 6/16/2024), Disp: , Rfl:     SYMBICORT 160-4.5 MCG/ACT inhaler, , Disp: , Rfl:     zolpidem (AMBIEN) 10 mg tablet, Take 10 mg by mouth (Patient not taking: Reported on 6/16/2024), Disp: , Rfl:     Current Allergies     Allergies as of 06/16/2024 - Reviewed 06/16/2024   Allergen Reaction Noted    Clindamycin Anaphylaxis 09/05/2016    Terbutaline Other (See Comments) 09/05/2016    Deferoxamine Palpitations 07/13/2018    Gemifloxacin GI Intolerance 09/05/2016    Levofloxacin GI Intolerance and Myalgia 09/05/2016    Deserpidine Other (See Comments) 09/05/2016    Trazodone Other (See Comments) 01/31/2018            The following portions of the patient's  history were reviewed and updated as appropriate: allergies, current medications, past family history, past medical history, past social history, past surgical history and problem list.     Past Medical History:   Diagnosis Date    Chronic bronchitis (HCC)     COPD (chronic obstructive pulmonary disease) (HCC)     Disease of thyroid gland     Iron deficiency anemia     Pneumonia     Sinusitis        Past Surgical History:   Procedure Laterality Date    ANKLE LIGAMENT RECONSTRUCTION Right     ARTHROSCOPIC REPAIR PCL Left     BACK SURGERY      HYSTERECTOMY      LUMBAR FUSION      UMBILICAL HERNIA REPAIR         Family History   Problem Relation Age of Onset    Diabetes Mother     Heart failure Mother     Hypertension Mother     Diverticulitis Mother     Gout Mother          Medications have been verified.        Objective   /79   Pulse 80   Temp (!) 97.2 °F (36.2 °C) (Temporal)   Resp 16   Wt 61.1 kg (134 lb 12.8 oz)   LMP 01/26/2018   SpO2 100%   BMI 23.14 kg/m²        Physical Exam     Physical Exam  Vitals and nursing note reviewed.   Constitutional:       General: She is not in acute distress.     Appearance: Normal appearance. She is not ill-appearing.   HENT:      Head: Normocephalic and atraumatic.   Cardiovascular:      Rate and Rhythm: Normal rate and regular rhythm.      Heart sounds: Normal heart sounds.   Pulmonary:      Effort: Pulmonary effort is normal. No respiratory distress.      Breath sounds: Normal breath sounds. No wheezing, rhonchi or rales.   Chest:      Chest wall: Tenderness (anterior ribs 7-8. No bruising or crepitus) present.   Musculoskeletal:      Right hand: Swelling (hand swelling, most significant at 2nd and 3rd digits) present. Normal sensation. Normal capillary refill. Normal pulse.      Right foot: Laceration (puncture wound dorsum of right foot) present. No swelling or tenderness.   Skin:     General: Skin is warm and dry.      Capillary Refill: Capillary refill takes  less than 2 seconds.      Findings: Bruising (multiple puncture wounds and bruises on arms and legs) present.   Neurological:      Mental Status: She is alert and oriented to person, place, and time.   Psychiatric:         Behavior: Behavior normal.

## 2024-08-03 ENCOUNTER — HOSPITAL ENCOUNTER (OUTPATIENT)
Dept: RADIOLOGY | Facility: HOSPITAL | Age: 54
Discharge: HOME | End: 2024-08-03
Payer: COMMERCIAL

## 2024-08-03 DIAGNOSIS — S92.314A CLOSED NONDISPLACED FRACTURE OF FIRST METATARSAL BONE OF RIGHT FOOT, INITIAL ENCOUNTER: ICD-10-CM

## 2024-08-03 DIAGNOSIS — R60.0 LOCALIZED EDEMA: ICD-10-CM

## 2024-08-03 PROCEDURE — 73630 X-RAY EXAM OF FOOT: CPT
